# Patient Record
Sex: FEMALE | Race: WHITE | Employment: FULL TIME | URBAN - METROPOLITAN AREA
[De-identification: names, ages, dates, MRNs, and addresses within clinical notes are randomized per-mention and may not be internally consistent; named-entity substitution may affect disease eponyms.]

---

## 2018-11-20 ENCOUNTER — OFFICE VISIT (OUTPATIENT)
Dept: ORTHOPEDIC SURGERY | Age: 48
End: 2018-11-20
Payer: COMMERCIAL

## 2018-11-20 VITALS
HEART RATE: 83 BPM | WEIGHT: 176 LBS | BODY MASS INDEX: 30.05 KG/M2 | DIASTOLIC BLOOD PRESSURE: 95 MMHG | SYSTOLIC BLOOD PRESSURE: 136 MMHG | HEIGHT: 64 IN

## 2018-11-20 DIAGNOSIS — M25.521 RIGHT ELBOW PAIN: Primary | ICD-10-CM

## 2018-11-20 DIAGNOSIS — R20.0 RIGHT ARM NUMBNESS: ICD-10-CM

## 2018-11-20 DIAGNOSIS — M77.11 RIGHT LATERAL EPICONDYLITIS: ICD-10-CM

## 2018-11-20 PROCEDURE — L3908 WHO COCK-UP NONMOLDE PRE OTS: HCPCS | Performed by: FAMILY MEDICINE

## 2018-11-20 PROCEDURE — 99203 OFFICE O/P NEW LOW 30 MIN: CPT | Performed by: FAMILY MEDICINE

## 2018-11-20 PROCEDURE — 20550 NJX 1 TENDON SHEATH/LIGAMENT: CPT | Performed by: FAMILY MEDICINE

## 2018-11-20 PROCEDURE — MISCD86 TENNIS ELBOW STRAP-BREG: Performed by: FAMILY MEDICINE

## 2018-11-20 RX ORDER — DICLOFENAC SODIUM 75 MG/1
75 TABLET, DELAYED RELEASE ORAL 2 TIMES DAILY WITH MEALS
Qty: 60 TABLET | Refills: 3 | Status: CANCELLED | OUTPATIENT
Start: 2018-11-20

## 2018-11-20 RX ORDER — HYDROCODONE BITARTRATE AND ACETAMINOPHEN 5; 325 MG/1; MG/1
TABLET ORAL
Refills: 0 | COMMUNITY
Start: 2018-11-15 | End: 2020-07-23

## 2018-11-20 RX ORDER — DROSPIRENONE AND ETHINYL ESTRADIOL 0.02-3(28)
1 KIT ORAL
COMMUNITY

## 2018-11-20 RX ORDER — TRAMADOL HYDROCHLORIDE 50 MG/1
50 TABLET ORAL EVERY 6 HOURS PRN
Qty: 28 TABLET | Refills: 0 | Status: SHIPPED | OUTPATIENT
Start: 2018-11-20 | End: 2018-11-27

## 2018-11-20 RX ORDER — METHYLPREDNISOLONE 4 MG/1
TABLET ORAL
Qty: 21 KIT | Refills: 0 | Status: SHIPPED | OUTPATIENT
Start: 2018-11-20 | End: 2020-07-23 | Stop reason: ALTCHOICE

## 2018-11-20 RX ORDER — DICLOFENAC SODIUM 75 MG/1
75 TABLET, DELAYED RELEASE ORAL
COMMUNITY
Start: 2018-10-17 | End: 2020-07-23

## 2018-11-20 NOTE — PROGRESS NOTES
STRAP-BREG     Patient was supplied a Breg Tennis Elbow Strap. This retail item was supplied to provide functional support and assist in protecting the affected area. Verbal and written instructions for the use of and application of this item were provided. The patient was educated and fit by a healthcare professional with expert knowledge and specialization in brace application. They were instructed to contact the office immediately should the equipment result in increased pain, decreased sensation, increased swelling or worsening of the condition.  XR ELBOW RIGHT (MIN 3 VIEWS)    External Referral To Occupational Therapy     Referral Priority:   Routine     Referral Type:   Eval and Treat     Referral Reason:   Specialty Services Required     Referred to Provider: Humble Bedoya OT     Requested Specialty:   Occupational Therapy     Number of Visits Requested:   1    EMG     Scheduling Instructions:      DR Bessie Linares OR [facility]     Order Specific Question:   Which body part? Answer:   RUE, R/O ULNAR NEUROPATHY/ CTS    IN BETAMETHASONE ACET&SOD PHOSP    IN INJECT TENDON SHEATH/LIGAMENT    Titan Wrist Orthosis Brace     Patient was prescribed a Kalina Mccartney Titan Wrist Orthosis. The right wrist will require stabilization / immobilization from this semi-rigid / rigid orthosis to improve their function. The orthosis will assist in protecting the affected area, provide functional support and facilitate healing. The patient was educated and fit by a healthcare professional with expert knowledge and specialization in brace application while under the direct supervision of the treating physician. Verbal and written instructions for the use of and application of this item were provided. They were instructed to contact the office immediately should the brace result in increased pain, decreased sensation, increased swelling or worsening of the condition.        Treatment Plan:  Treatment options

## 2018-12-06 ENCOUNTER — OFFICE VISIT (OUTPATIENT)
Dept: ORTHOPEDIC SURGERY | Age: 48
End: 2018-12-06
Payer: COMMERCIAL

## 2018-12-06 DIAGNOSIS — M79.601 PAIN OF RIGHT UPPER EXTREMITY: Primary | ICD-10-CM

## 2018-12-06 PROCEDURE — 95908 NRV CNDJ TST 3-4 STUDIES: CPT | Performed by: PHYSICAL MEDICINE & REHABILITATION

## 2018-12-06 PROCEDURE — 95886 MUSC TEST DONE W/N TEST COMP: CPT | Performed by: PHYSICAL MEDICINE & REHABILITATION

## 2018-12-13 DIAGNOSIS — R20.0 RIGHT ARM NUMBNESS: ICD-10-CM

## 2018-12-13 DIAGNOSIS — M77.11 RIGHT LATERAL EPICONDYLITIS: Primary | ICD-10-CM

## 2018-12-13 DIAGNOSIS — M25.521 RIGHT ELBOW PAIN: ICD-10-CM

## 2020-06-02 ENCOUNTER — OFFICE VISIT (OUTPATIENT)
Dept: ORTHOPEDIC SURGERY | Age: 50
End: 2020-06-02
Payer: COMMERCIAL

## 2020-06-02 ENCOUNTER — HOSPITAL ENCOUNTER (OUTPATIENT)
Dept: PHYSICAL THERAPY | Age: 50
Setting detail: THERAPIES SERIES
Discharge: HOME | End: 2020-06-02
Payer: COMMERCIAL

## 2020-06-02 VITALS — BODY MASS INDEX: 29.02 KG/M2 | WEIGHT: 170 LBS | HEIGHT: 64 IN

## 2020-06-02 PROCEDURE — 97161 PT EVAL LOW COMPLEX 20 MIN: CPT | Performed by: PHYSICAL THERAPIST

## 2020-06-02 PROCEDURE — 99214 OFFICE O/P EST MOD 30 MIN: CPT | Performed by: FAMILY MEDICINE

## 2020-06-02 PROCEDURE — L0625 LO FLEX L1-BELOW L5 PRE OTS: HCPCS | Performed by: FAMILY MEDICINE

## 2020-06-02 PROCEDURE — 97110 THERAPEUTIC EXERCISES: CPT | Performed by: PHYSICAL THERAPIST

## 2020-06-02 RX ORDER — CYCLOBENZAPRINE HCL 5 MG
TABLET ORAL
COMMUNITY
Start: 2020-05-31 | End: 2020-07-23

## 2020-06-02 RX ORDER — DICLOFENAC SODIUM 75 MG/1
TABLET, DELAYED RELEASE ORAL
Qty: 180 TABLET | Refills: 0 | Status: SHIPPED | OUTPATIENT
Start: 2020-06-02 | End: 2020-07-23

## 2020-06-02 RX ORDER — DICLOFENAC SODIUM 75 MG/1
75 TABLET, DELAYED RELEASE ORAL 2 TIMES DAILY
Qty: 60 TABLET | Refills: 3 | Status: SHIPPED | OUTPATIENT
Start: 2020-06-02 | End: 2020-06-02

## 2020-06-02 RX ORDER — METHYLPREDNISOLONE 4 MG/1
TABLET ORAL
Qty: 21 KIT | Refills: 0 | Status: SHIPPED | OUTPATIENT
Start: 2020-06-02 | End: 2020-07-23 | Stop reason: ALTCHOICE

## 2020-06-02 NOTE — FLOWSHEET NOTE
B hip IR                     Supine Exam Normal Abnormal N/A Comments   Hip flexion       Abduction       ER       IR       ELÍAS/Derek       Hip scour       SLR       Crossed SLR       Supine to sit       SI distraction/compression       Hip thrust                     Prone Exam Normal Abnormal N/A Comments   Prone knee bend       Prone hip IR       B Achilles reflex/Pheasant       PA/Spring       Prone Instability test       Sacral Spring/thrust                       ROM LEFT RIGHT Comments   Lumbar Flex      Lumbar Ext      Side Bend      Rotation                    ROM LEFT RIGHT Comments   Hip Flexion      Hip Abd      Hip ER      Hip IR      Hip Extension      Knee Ext      Knee Flex      Hamstring Flex      Piriformis                    Strength LEFT RIGHT Comments   Multifidus      Transverse Ab      Hip Flexors      Hip Abductors      Hip Extensors                     Myotomes Normal Abnormal Comments   Hip flexion (L1-L2)      Knee extension (L2-L4)      Dorsiflexion (L4-L5)      Great Toe Ext (L5)      Ankle Eversion (S1-S2)      Ankle PF(S1-S2)          Dermatomes Normal Abnormal Comments   inguinal area (L1)       anterior mid-thigh (L2)      distal ant thigh/med knee (L3)      medial lower leg and foot (L4)      lateral lower leg and foot (L5)      posterior calf (S1)      medial calcaneus (S2)          Neural dynamic tension testing Normal Abnormal Comments   Slump Test  - Degree of knee flexion:       SLR       0-30      30-70      Femoral nerve (L2-4)        Reflexes Normal Abnormal Comments   S1-2 Seated achilles      S1-2 Prone knee bend      L3-4 Patellar tendon      C5-6 Biceps      C6 Brachioradialis      C7-8 Triceps      Clonus      Babinski      Flores's            Positional Tolerance     Standing repeated flex    Standing repeated ext    Prone laying    Prone prop    Prone laying with pillow    Supine manual B LE distraction    Supine with wedge under LE HEP and progression per patient tolerance, in order to prevent re-injury. [] Progressing: [] Met: [] Not Met: [] Adjusted   2. Patient will report pain at worst less than or equal to 8/10  [] Progressing: [] Met: [] Not Met: [] Adjusted    Long Term Goals: To be achieved in: 6 weeks  1. Disability index score of 15% or less for the SYMONE to assist with reaching prior level of function. [] Progressing: [] Met: [] Not Met: [] Adjusted  2. Patient will demonstrate increased AROM to full lumbar mobility to allow for proper joint functioning as indicated by patients functional deficits. [] Progressing: [] Met: [] Not Met: [] Adjusted  3. Patient will demonstrate an increase in Strength to left hip flexor and hamstring to 4+/5 without pain in back to allow for proper functional mobility as indicated by patients Functional Deficits. [] Progressing: [] Met: [] Not Met: [] Adjusted  4. Patient will return to performing all self care without increased symptoms or restriction. [] Progressing: [] Met: [] Not Met: [] Adjusted  5. Pt will report pain at worst less than or equal to 2/10. [] Progressing: [] Met: [] Not Met: [] Adjusted     Overall Progression Towards Functional goals/ Treatment Progress Update:  [] Patient is progressing as expected towards functional goals listed. [] Progression is slowed due to complexities/Impairments listed. [] Progression has been slowed due to co-morbidities.   [x] Plan just implemented, too soon to assess goals progression <30days   [] Goals require adjustment due to lack of progress  [] Patient is not progressing as expected and requires additional follow up with physician  [] Other    Prognosis for POC: [x] Good [] Fair  [] Poor      Patient requires continued skilled intervention: [x] Yes  [] No    Treatment/Activity Tolerance:  [x] Patient able to complete treatment  [] Patient limited by fatigue  [] Patient limited by pain     [] Patient limited by other medical complications  [] Other: Pt is a 50 y/o female presenting with diagnosis of left sided LBP from the MD.  Clinically, the pt presents with decreased ROM, decreased strength, decreased function, and increased pain consistent with the MD diagnosis.  The pt would benefit from skilled PT to return to P[facility].  Pt had significant pain starting on Sunday. She had to go to urgent care. However, she has had improvement over the last day. Her pain does appear to be muscular in nature possibly secondary to scoliosis. We did review her anatomy and normal anatomy. Pt was understanding. We were unable to schedule her for next week at this point, but we are planning on calling her to get her in next week when we hopefully have a more full staff.  Anais Mccullough did review insurance benefits as well.  They did not have questions regarding this.      PLAN: If pt doesn't return, this note can be considered a D/C note.   See eval.  Consider STM over quadratus lumborum on left, progressing core exercises, adding bike for warm up.  [] Continue per plan of care [] Alter current plan (see comments above)  [x] Plan of care initiated [] Hold pending MD visit [] Discharge  Electronically signed by: Pricila March DPT [id number]

## 2020-06-02 NOTE — PLAN OF CARE
[address], [address] [place], [address]  Phone: [phone]   Fax: [phone]              Physical Therapy Certification    Dear Referring Practitioner: Desire Beaver,    We had the pleasure of evaluating the following patient for physical therapy services at     [address] [place] [address]. A summary of our findings can be found in the initial assessment below. This includes our plan of care. If you have any questions or concerns regarding these findings, please do not hesitate to contact me at the office phone number checked above. Thank you for the referral.       Physician Signature:_______________________________Date:__________________  By signing above (or electronic signature), therapists plan is approved by physician      Patient: Nico Stanley   : 1970   MRN: [id number]  Referring Physician: Referring Practitioner: Desire Beaver      Evaluation Date: 2020      Medical Diagnosis Information:  Diagnosis: M41.9 (ICD-10-CM) - Scoliosis of lumbar spine, unspecified scoliosis type; S39.012A (ICD-10-CM) - Strain of lumbar region, initial encounter; M54.5 (ICD-10-CM) - Acute left-sided low back pain, unspecified whether sciatica present   Treatment Diagnosis: M54.5 (ICD-10-CM) - Acute left-sided low back pain, unspecified whether sciatica present          Precautions/ Contra-indications: occ headaches/migraines  C-SSRS Triggered by Intake questionnaire (Past 2 wk assessment):   [x] No, Questionnaire did not trigger screening.   [] Yes, Patient intake triggered further evaluation      [] C-SSRS Screening completed  [] PCP notified via Plan of Care  [] Emergency services notified   Latex Allergy:  [x] NO      [] YES  Preferred Language for Healthcare:   [x] English       [] other:    SUBJECTIVE: Patient stated complaint:  She was fine one minute, but the next minute it hurt. She had pain that started .   The pain was so consider:    [] no symptoms distal to the knee    [] no red flags and minimal yellow flags    [] no contraindications to manipulation   []  \"Traction subgroup\"      [] signs and symptoms of nerve root compression (radiculopathy)     [] unable to centralize distal symptoms with movement    [] pain or numbness in the lumbar spine, buttock, and/or LE    [] peripheralization with extension movements    [] + SLR or + crossed SLR (symptoms less than 70 degrees)  [] Movement Control Approach   [] \"Stabilization subgroup\"    [] younger age (less than 36)     [] greater general flexibility (post-partum, SLR >90)    [] abberant movements, painful arc, or Wheatland's sign with flex/ext ROM    [] positive prone instability test  [x] Functional Optimization Approach   [] \"unspecified subgroup\"    [] lower disability scores     [] lower fear avoidance scores     [] longer duration of symptoms (chronic)    [] prior episodes of low back pain    [] older age                                  [x] Patient history, allergies, meds reviewed. Medical chart reviewed. See intake form. Review Of Systems (ROS):  [x]Performed Review of systems (Integumentary, CardioPulmonary, Neurological) by intake and observation. Intake form has been scanned into medical record. Patient has been instructed to contact their primary care physician regarding ROS issues if not already being addressed at this time.       Co-morbidities/Complexities (which will affect course of rehabilitation):   [x]None           Arthritic conditions   []Rheumatoid arthritis (M05.9)  []Osteoarthritis (M19.91)   Cardiovascular conditions   []Hypertension (I10)  []Hyperlipidemia (E78.5)  []Angina pectoris (I20)  []Atherosclerosis (I70)   Musculoskeletal conditions   []Disc pathology   []Congenital spine pathologies   []Prior surgical intervention  []Osteoporosis (M81.8)  []Osteopenia (M85.8)   Endocrine conditions   []Hypothyroid (E03.9)  []Hyperthyroid Gastrointestinal conditions PT to return to [facility]. Pt had significant pain starting on Sunday. She had to go to urgent care. However, she has had improvement over the last day. Her pain does appear to be muscular in nature possibly secondary to scoliosis. We did review her anatomy and normal anatomy. Pt was understanding. We were unable to schedule her for next week at this point, but we are planning on calling her to get her in next week when we hopefully have a more full staff. We did review insurance benefits as well. They did not have questions regarding this. Prognosis/Rehab Potential:      []Excellent   [x]Good    []Fair   []Poor    Tolerance of evaluation/treatment:    []Excellent   [x]Good    []Fair   []Poor    PLAN: Begin PT focusing on: proximal hip mobilizations, LB mobs, LB core activation, proximal hip activation, and HEP    Frequency/Duration:  1-2 days per week for 4-6 Weeks:  Interventions:  [x]  Therapeutic exercise including: strength training, ROM, for LE, Glutes and core   [x]  NMR activation and proprioception for glutes , LE and Core   [x]  Manual therapy as indicated for Hip complex, LE and spine to include: Dry Needling/IASTM, STM, PROM, Gr I-IV mobilizations, manipulation. [x]  Modalities as needed that may include: thermal agents, E-stim, Biofeedback, US, iontophoresis as indicated  [x]  Patient education on joint protection, postural re-education, activity modification, progression of HEP. HEP instruction: (see scanned forms)      GOALS:   Patient stated goal: no pain. Normal movement    [] Progressing: [] Met: [] Not Met: [] Adjusted    Therapist goals for Patient:   Short Term Goals: To be achieved in: 2 weeks  1. Independent in HEP and progression per patient tolerance, in order to prevent re-injury. [] Progressing: [] Met: [] Not Met: [] Adjusted   2. Patient will report pain at worst less than or equal to 8/10  [] Progressing: [] Met: [] Not Met: [] Adjusted    Long Term Goals:  To be achieved in: 6 weeks  1. Disability index score of 15% or less for the SYMONE to assist with reaching prior level of function. [] Progressing: [] Met: [] Not Met: [] Adjusted  2. Patient will demonstrate increased AROM to full lumbar mobility to allow for proper joint functioning as indicated by patients functional deficits. [] Progressing: [] Met: [] Not Met: [] Adjusted  3. Patient will demonstrate an increase in Strength to left hip flexor and hamstring to 4+/5 without pain in back to allow for proper functional mobility as indicated by patients Functional Deficits. [] Progressing: [] Met: [] Not Met: [] Adjusted  4. Patient will return to performing all self care without increased symptoms or restriction. [] Progressing: [] Met: [] Not Met: [] Adjusted  5. Pt will report pain at worst less than or equal to 2/10. [] Progressing: [] Met: [] Not Met: [] Adjusted      Physical Therapy Evaluation Complexity Justification  [x] A history of present problem with:  [] no personal factors and/or comorbidities that impact the plan of care;  [x]1-2 personal factors and/or comorbidities that impact the plan of care  []3 personal factors and/or comorbidities that impact the plan of care  [x] An examination of body systems using standardized tests and measures addressing any of the following: body structures and functions (impairments), activity limitations, and/or participation restrictions;:  [] a total of 1-2 or more elements   [] a total of 3 or more elements   [x] a total of 4 or more elements   [x] A clinical presentation with:  [x] stable and/or uncomplicated characteristics   [] evolving clinical presentation with changing characteristics  [] unstable and unpredictable characteristics;   [x] Clinical decision making of [] low, [] moderate, [] high complexity using standardized patient assessment instrument and/or measurable assessment of functional outcome.     [x] EVAL (LOW) 91718 (typically 20 minutes

## 2020-06-02 NOTE — PROGRESS NOTES
Chief Complaint  Lower Back Pain (Lower Left Back)    History of Present Illness for New Patient:    Darian Pereira is a 52 y.o. female is a very pleasant white female right-hand dominant white female, with wife of Amando Khan who is a  who is a patient of Dr. Cynthia Hughes who is being seen today upon self-referral for evaluation of acute onset of pain to her left greater than right lumbar spine. She states that on 5/31/2020 after repetitively using her  she was finishing up when she felt a very sharp sudden onset of pain to the left side of her lower lumbar spine. Her past history is remarkable for scoliosis but she is never had radiculopathy. Her pain symptoms did progressively worsen to the point where she went to urgent care and was given a muscle relaxer and told to undergo relative rest.  She has been sleeping on the couch with mild improvement of her symptoms. She does have a baseline achy pain at 2 to 3-10 but if she twists and rotates it will be very sharp at 8-9 out of 10 pain. She has had some mild spasm and is not complaining of true radicular symptoms. She has been told in the past that she has 2 disc protrusions in the cervical spine and some herniations of the lumbar spine on MRI work-up several years ago in [facility] when she is being worked up for [address] which she does not have. She has been icing has not been really taking any type of anti-inflammatories and is being seen today for orthopedic and sports consultation with imaging. I have reviewed and attest the documentation of the HPI documented by my . I will make any changes if necessary. Enc Date: 6/2/2020  Time: 12:25 PM  Provider: Valley Hatchet, MD        Review of Systems  Pertinent items are noted in HPI  Review of systems reviewed from Patient History Form dated on 6/2/2020 and available in the patient's chart under the Media tab.        Vital Signs     Ht 5' 4\" (1.626 m)   Wt 170 lb (77.1 kg)   BMI 29.18 kg/m²     Past Medical History   has no past medical history on file. Past Surgical History   has no past surgical history on file. Social History     Tobacco Use    Smoking status: Never Smoker    Smokeless tobacco: Never Used   Substance Use Topics    Alcohol use: Not on file    Drug use: Not on file        Current Outpatient Medications   Medication Sig Dispense Refill    methylPREDNISolone (MEDROL DOSEPACK) 4 MG tablet Take by mouth as directed. 21 kit 0    diclofenac (VOLTAREN) 75 MG EC tablet Take 1 tablet by mouth 2 times daily 60 tablet 3    drospirenone-ethinyl estradiol (ANNI) 3-0.02 MG per tablet Take 1 tablet by mouth      cyclobenzaprine (FLEXERIL) 5 MG tablet TK 1 T PO Q 8 H FOR UP TO 10 DAYS PRF MUSCLE SPASMS. AVOID DRIVING WITHIN 8 H OF USING THIS MEDICNE      diclofenac (VOLTAREN) 75 MG EC tablet Take 75 mg by mouth      HYDROcodone-acetaminophen (NORCO) 5-325 MG per tablet TK 1 T PO Q 8 H PRN  0    methylPREDNISolone (MEDROL, ROSALIE,) 4 MG tablet Use as directed (Patient not taking: Reported on 6/2/2020) 21 kit 0     No current facility-administered medications for this visit. General Exam:   Constitutional: Patient is adequately groomed with no evidence of malnutrition  DTRs: Deep tendon reflexes are intact  Mental Status: The patient is oriented to time, place and person. The patient's mood and affect are appropriate. Lymphatic: The lymphatic examination bilaterally reveals all areas to be without enlargement or induration. Vascular: Examination reveals no swelling or calf tenderness. Peripheral pulses are palpable and 2+. Neurological: The patient has good coordination. There is no weakness or sensory deficit. Lumbar spine examination    Inspection: She does have an obvious scoliosis deformity but no focal soft tissue swelling, ecchymosis or palpable spasm.     Palpation: She does have clinical tenderness over the left greater than right

## 2020-06-11 ENCOUNTER — HOSPITAL ENCOUNTER (OUTPATIENT)
Dept: PHYSICAL THERAPY | Age: 50
Setting detail: THERAPIES SERIES
Discharge: HOME | End: 2020-06-11
Payer: COMMERCIAL

## 2020-06-11 PROCEDURE — 97112 NEUROMUSCULAR REEDUCATION: CPT

## 2020-06-11 PROCEDURE — 97110 THERAPEUTIC EXERCISES: CPT

## 2020-06-11 PROCEDURE — 97140 MANUAL THERAPY 1/> REGIONS: CPT

## 2020-06-11 NOTE — FLOWSHEET NOTE
(aberrant juttering or innominate mvmt)       Extension       Trendelenburg       Kemps/Quadrant       Stork       SLS/SLS w rotation                     Seated Exam Normal Abnormal N/A Comments   Pelvic Height       Seated Rotation       Seated flexion       B hip IR                     Supine Exam Normal Abnormal N/A Comments   Hip flexion       Abduction       ER       IR       ELÍAS/Derek       Hip scour       SLR       Crossed SLR       Supine to sit       SI distraction/compression       Hip thrust                     Prone Exam Normal Abnormal N/A Comments   Prone knee bend       Prone hip IR       B Achilles reflex/Pheasant       PA/Spring       Prone Instability test       Sacral Spring/thrust                       ROM LEFT RIGHT Comments   Lumbar Flex      Lumbar Ext      Side Bend      Rotation                    ROM LEFT RIGHT Comments   Hip Flexion      Hip Abd      Hip ER      Hip IR      Hip Extension      Knee Ext      Knee Flex      Hamstring Flex      Piriformis                    Strength LEFT RIGHT Comments   Multifidus      Transverse Ab      Hip Flexors      Hip Abductors      Hip Extensors                     Myotomes Normal Abnormal Comments   Hip flexion (L1-L2)      Knee extension (L2-L4)      Dorsiflexion (L4-L5)      Great Toe Ext (L5)      Ankle Eversion (S1-S2)      Ankle PF(S1-S2)          Dermatomes Normal Abnormal Comments   inguinal area (L1)       anterior mid-thigh (L2)      distal ant thigh/med knee (L3)      medial lower leg and foot (L4)      lateral lower leg and foot (L5)      posterior calf (S1)      medial calcaneus (S2)          Neural dynamic tension testing Normal Abnormal Comments   Slump Test  - Degree of knee flexion:       SLR       0-30      30-70      Femoral nerve (L2-4)        Reflexes Normal Abnormal Comments   S1-2 Seated achilles      S1-2 Prone knee bend      L3-4 Patellar tendon      C5-6 Biceps      C6 Brachioradialis      C7-8 Triceps      Clonus Babinski      Flores's            Positional Tolerance     Standing repeated flex    Standing repeated ext    Prone laying    Prone prop    Prone laying with pillow    Supine manual B LE distraction    Supine with wedge under LE              RESTRICTIONS/PRECAUTIONS: occ headaches/migraines, gal bladder , , laproscopy, scoliosis, 2 herniated disc in neck and back. Exercises/Interventions:         Therapeutic Ex (68937) Resistance/Repetitions Notes/CUES   LTR 10x:10    DKTC 8x:10 Some pain. Stopped early. Discussed SKTC for home or seated LB stretch   Seated quadratus lumborum stretch with arm OH 3x:30 Going to right   Supine hamstring stretch 3x:30 Left                        Therapeutic Activity (64602)                                        NMR re-education (24690)  CUES NEEDED   hooklying ab brace with pelvic floor contraction 10x:10    bridge 2x10 Cued for glute set  At TA prior to lift    Hip hikes  2x10 each side     SLS [id number] Cues for level hips and not to lean to left for compensation             Manual Intervention (06114)         Access Code: [email or web address]   URL: [employer]/   Date: 2020   Prepared by: Alda Chew     Exercises  Supine Lower Trunk Rotation - 10 reps - 1 sets - 10 hold - 1-2x daily - 7x weekly  Supine Double Knee to Chest - 10 reps - 10 hold - 1-2x daily - 7x weekly  Seated Quadratus Lumborum Stretch with Arm Overhead - 3-5 reps - 30 hold - 1-2x daily - 7x weekly  Supine Hamstring Stretch with Strap - 3-5 reps - 30 hold - 1-2x daily - 7x weekly  Supine Transversus Abdominis Bracing with Pelvic Floor Contraction - 10 reps - 10 hold - 1-2x daily - 7x weekly    Therapeutic Exercise and NMR EXR  [x] (77950) Provided verbal/tactile cueing for activities related to strengthening, flexibility, endurance, ROM for improvements in LE, proximal hip, and core control with self care, mobility, lifting, ambulation.   [x] (30828) Provided verbal/tactile

## 2020-06-18 ENCOUNTER — HOSPITAL ENCOUNTER (OUTPATIENT)
Dept: PHYSICAL THERAPY | Age: 50
Setting detail: THERAPIES SERIES
Discharge: HOME | End: 2020-06-18
Payer: COMMERCIAL

## 2020-06-18 PROCEDURE — 97140 MANUAL THERAPY 1/> REGIONS: CPT | Performed by: PHYSICAL THERAPIST

## 2020-06-18 PROCEDURE — 97112 NEUROMUSCULAR REEDUCATION: CPT | Performed by: PHYSICAL THERAPIST

## 2020-06-18 PROCEDURE — 97110 THERAPEUTIC EXERCISES: CPT | Performed by: PHYSICAL THERAPIST

## 2020-06-18 NOTE — FLOWSHEET NOTE
Orthopaedics and [address] [place] [address] DR PALMIRA KERN    Physical Therapy Treatment Note/ Progress Report:           Date:  2020    Patient Name:  Nico Stanley    :  1970  MRN: [id number]  Restrictions/Precautions:    Medical/Treatment Diagnosis Information:  · Diagnosis: M41.9 (ICD-10-CM) - Scoliosis of lumbar spine, unspecified scoliosis type; S39.012A (ICD-10-CM) - Strain of lumbar region, initial encounter; M54.5 (ICD-10-CM) - Acute left-sided low back pain, unspecified whether sciatica present. Onset-31 May 2020  · Treatment Diagnosis: M54.5 (ICD-10-CM) - Acute left-sided low back pain, unspecified whether sciatica present  Insurance/Certification information:  PT Insurance Information: Joe  Physician Information:  Referring Practitioner: Desire Beaver  Has the plan of care been signed (Y/N):        []  Yes  [x]  No     Date of Patient follow up with Physician: around 2020 prn      Is this a Progress Report:     []  Yes  [x]  No        If Yes:  Date Range for reporting period:  Beginning 2020  Ending    Progress report will be due (10 Rx or 30 days whichever is less): visit 10 or 1565       Recertification will be due (POC Duration  / 90 days whichever is less): see above         Visit # Insurance Allowable Auth Required   3 30, but needs auth after 25 [x]  Yes []  No        Functional Scale: SYMONE-35%   Date assessed: 2020    Latex Allergy:  [x] NO      [] YES  Preferred Language for Healthcare:   [] English       [] other:      Pain level:  1/10      SUBJECTIVE:  Her lower back is hurting as she feels she slept on it funny last night. This is not the same pain that she came in for.        OBJECTIVE: See eval      Standing Exam Normal Abnormal N/A Comments   Toe walk         Heel Walk       Side bending       Pelvic Height       Fwd Bend- (aberrant juttering or innominate mvmt)       Extension       Trendelenburg       Kemps/Quadrant       Stork       SLS/SLS w rotation Prone laying with pillow    Supine manual B LE distraction    Supine with wedge under LE              RESTRICTIONS/PRECAUTIONS: occ headaches/migraines, gal bladder , , laproscopy, scoliosis, 2 herniated disc in neck and back. Exercises/Interventions:         Therapeutic Ex (02124) Resistance/Repetitions Notes/CUES   LTR 10x:10    DKTC 10x:10 Some pain. Stopped early. Discussed SKTC for home or seated LB stretch   Seated quadratus lumborum stretch with arm OH 3x:30 Going to right   Supine hamstring stretch 3x:30 Left. Did cause pain. Therapeutic Activity (83694)     Bike 6' L1                                  NMR re-education (79726)  CUES NEEDED   hooklying ab brace with pelvic floor contraction 10x:10 With biofeedback and tactile cuing   bridge 2x10 Cued for glute set  At TA prior to lift    Hip hikes  2x10 each side     SLS [id number] bilaterally Cues for level hips and not to lean to left for compensation   sidelying hip circles 10x each To engage core        Manual Intervention (87009) STM over paraspinals and quadratus lumborum in sidelying manipulation position with rotation stretch 8'       Access Code: [email or web address]   URL: [email or web address]   Date: 2020   Prepared by: Omaira Chew     Exercises  Supine Lower Trunk Rotation - 10 reps - 1 sets - 10 hold - 1-2x daily - 7x weekly  Supine Double Knee to Chest - 10 reps - 10 hold - 1-2x daily - 7x weekly  Seated Quadratus Lumborum Stretch with Arm Overhead - 3-5 reps - 30 hold - 1-2x daily - 7x weekly  Supine Hamstring Stretch with Strap - 3-5 reps - 30 hold - 1-2x daily - 7x weekly  Supine Transversus Abdominis Bracing with Pelvic Floor Contraction - 10 reps - 10 hold - 1-2x daily - 7x weekly  Standing Hip Hiking - 10 reps - 2 sets - 1-2x daily - 7x weekly  Sidelying Hip Circles - 10 reps - 1-3 sets - 1-2x daily - 7x weekly  Single Leg Balance - 3 reps - 20 hold - 1-2x daily - 7x weekly    Therapeutic Exercise and NMR EXR  [x] (25481) Provided verbal/tactile cueing for activities related to strengthening, flexibility, endurance, ROM for improvements in LE, proximal hip, and core control with self care, mobility, lifting, ambulation. [x] (63595) Provided verbal/tactile cueing for activities related to improving balance, coordination, kinesthetic sense, posture, motor skill, proprioception  to assist with LE, proximal hip, and core control in self care, mobility, lifting, ambulation and eccentric single leg control.      NMR and Therapeutic Activities:    [x] (30193 or 36097) Provided verbal/tactile cueing for activities related to improving balance, coordination, kinesthetic sense, posture, motor skill, proprioception and motor activation to allow for proper function of core, proximal hip and LE with self care and ADLs  [x] (09469) Gait Re-education- Provided training and instruction to the patient for proper LE, core and proximal hip recruitment and positioning and eccentric body weight control with ambulation re-education including up and down stairs     Home Exercise Program:    [x] (34556) Reviewed/Progressed HEP activities related to strengthening, flexibility, endurance, ROM of core, proximal hip and LE for functional self-care, mobility, lifting and ambulation/stair navigation   [] (66845)Reviewed/Progressed HEP activities related to improving balance, coordination, kinesthetic sense, posture, motor skill, proprioception of core, proximal hip and LE for self care, mobility, lifting, and ambulation/stair navigation      Manual Treatments:  PROM / STM / Oscillations-Mobs:  G-I, II, III, IV (PA's, Inf., Post.)  [x] (99994) Provided manual therapy to mobilize LE, proximal hip and/or LS spine soft tissue/joints for the purpose of modulating pain, promoting relaxation,  increasing ROM, reducing/eliminating soft tissue swelling/inflammation/restriction, improving soft tissue extensibility and allowing for proper functional goals listed. [] Progression is slowed due to complexities/Impairments listed. [] Progression has been slowed due to co-morbidities. [x] Plan just implemented, too soon to assess goals progression <30days   [] Goals require adjustment due to lack of progress  [] Patient is not progressing as expected and requires additional follow up with physician  [] Other    Prognosis for POC: [x] Good [] Fair  [] Poor      Patient requires continued skilled intervention: [x] Yes  [] No    Treatment/Activity Tolerance:  [x] Patient able to complete treatment  [] Patient limited by fatigue  [] Patient limited by pain     [] Patient limited by other medical complications  [] Other:  Pt tito tx well. She has significantly less pain than when she started. She demo fairly good comprehension of HEP. I did have to give her cues for ab brace, and she did improve with this by the time she got though the rest of them. She will f/u again next week with the plan of returning to see the MD soon afterwards. She did demo some hip ABD weakness as she felt the hip ABD circles in her glute med. Pt would continue to benefit from skilled PT to improve strength, ROM and function    PLAN: If pt doesn't return, this note can be considered a D/C note. See eval.  Progress core work.   [] Continue per plan of care [] Alter current plan (see comments above)  [x] Plan of care initiated [] Hold pending MD visit [] Discharge  Electronically signed by: Deena Mcleod DPT [id number]

## 2020-06-25 ENCOUNTER — HOSPITAL ENCOUNTER (OUTPATIENT)
Dept: PHYSICAL THERAPY | Age: 50
Setting detail: THERAPIES SERIES
Discharge: HOME | End: 2020-06-25
Payer: COMMERCIAL

## 2020-06-25 PROCEDURE — 97112 NEUROMUSCULAR REEDUCATION: CPT | Performed by: PHYSICAL THERAPIST

## 2020-06-25 PROCEDURE — 97110 THERAPEUTIC EXERCISES: CPT | Performed by: PHYSICAL THERAPIST

## 2020-06-25 PROCEDURE — 97140 MANUAL THERAPY 1/> REGIONS: CPT | Performed by: PHYSICAL THERAPIST

## 2020-06-25 NOTE — FLOWSHEET NOTE
Orthopaedics and [address] [place] [address] DR PALMIRA KERN    Physical Therapy Treatment Note/ Progress Report:           Date:  2020    Patient Name:  Diya Merrill    :  1970  MRN: [id number]  Restrictions/Precautions:    Medical/Treatment Diagnosis Information:  · Diagnosis: M41.9 (ICD-10-CM) - Scoliosis of lumbar spine, unspecified scoliosis type; S39.012A (ICD-10-CM) - Strain of lumbar region, initial encounter; M54.5 (ICD-10-CM) - Acute left-sided low back pain, unspecified whether sciatica present. Onset-31 May 2020  · Treatment Diagnosis: M54.5 (ICD-10-CM) - Acute left-sided low back pain, unspecified whether sciatica present  Insurance/Certification information:  PT Insurance Information: Joe  Physician Information:  Referring Practitioner: Usman Loera  Has the plan of care been signed (Y/N):        []  Yes  [x]  No     Date of Patient follow up with Physician: around 2020 prn      Is this a Progress Report:     []  Yes  [x]  No        If Yes:  Date Range for reporting period:  Beginning 2020  Ending    Progress report will be due (10 Rx or 30 days whichever is less): visit 10 or 9313       Recertification will be due (POC Duration  / 90 days whichever is less): see above         Visit # Insurance Allowable Auth Required   4 30, but needs auth after 25 [x]  Yes []  No        Functional Scale: SYMONE-35%   Date assessed: 2020    Latex Allergy:  [x] NO      [] YES  Preferred Language for Healthcare:   [] English       [] other:      Pain level:  0/10      SUBJECTIVE:  She is not having her pain that brought her here. She has her normal back pain. She thinks it is from sleeping. She's had this problem before.       OBJECTIVE: See eval      Standing Exam Normal Abnormal N/A Comments   Toe walk         Heel Walk       Side bending       Pelvic Height       Fwd Bend- (aberrant juttering or innominate mvmt)       Extension       Trendelenburg       Kemps/Quadrant       Stork relaxation,  increasing ROM, reducing/eliminating soft tissue swelling/inflammation/restriction, improving soft tissue extensibility and allowing for proper ROM for normal function with self care, mobility, lifting and ambulation. Modalities: MHP? Charges:   Timed Code Treatment Minutes: 61'   Total Treatment Minutes: 79'     [] EVAL (LOW) 455 1011   [] EVAL (MOD) 25753   [] EVAL (HIGH) 34859   [] RE-EVAL   [x] GY(32232) x 1    [] IONTO  [x] NMR (57649) x 2     [] VASO  [x] Manual (42434) x 1     [] Other:  [] TA x      [] Mech Traction (09101)  [] ES(attended) (32186)      [] ES (un) (34464):       GOALS:   Patient stated goal: no pain. Normal movement    [] Progressing: [] Met: [] Not Met: [] Adjusted    Therapist goals for Patient:   Short Term Goals: To be achieved in: 2 weeks  1. Independent in HEP and progression per patient tolerance, in order to prevent re-injury. [] Progressing: [] Met: [] Not Met: [] Adjusted   2. Patient will report pain at worst less than or equal to 8/10  [] Progressing: [] Met: [] Not Met: [] Adjusted    Long Term Goals: To be achieved in: 6 weeks  1. Disability index score of 15% or less for the SYMONE to assist with reaching prior level of function. [] Progressing: [] Met: [] Not Met: [] Adjusted  2. Patient will demonstrate increased AROM to full lumbar mobility to allow for proper joint functioning as indicated by patients functional deficits. [] Progressing: [] Met: [] Not Met: [] Adjusted  3. Patient will demonstrate an increase in Strength to left hip flexor and hamstring to 4+/5 without pain in back to allow for proper functional mobility as indicated by patients Functional Deficits. [] Progressing: [] Met: [] Not Met: [] Adjusted  4. Patient will return to performing all self care without increased symptoms or restriction. [] Progressing: [] Met: [] Not Met: [] Adjusted  5. Pt will report pain at worst less than or equal to 2/10.     [] Progressing: []

## 2020-07-02 ENCOUNTER — OFFICE VISIT (OUTPATIENT)
Dept: ORTHOPEDIC SURGERY | Age: 50
End: 2020-07-02
Payer: COMMERCIAL

## 2020-07-02 VITALS — HEIGHT: 64 IN | WEIGHT: 169.97 LBS | BODY MASS INDEX: 29.02 KG/M2

## 2020-07-02 PROCEDURE — 99214 OFFICE O/P EST MOD 30 MIN: CPT | Performed by: FAMILY MEDICINE

## 2020-07-02 RX ORDER — CELECOXIB 200 MG/1
200 CAPSULE ORAL DAILY
Qty: 60 CAPSULE | Refills: 3 | Status: SHIPPED | OUTPATIENT
Start: 2020-07-02 | End: 2020-07-07

## 2020-07-02 NOTE — PROGRESS NOTES
Chief Complaint  Follow-up (Lower Back Pain (Lower Left Back))    History of Present Illness for New Patient:    Patricia Sol is a 48 y.o. female is a very pleasant white female right-hand dominant white female, with wife of Beck Arriola who is a  who is a patient of Dr. Jacinto Brooks who is being seen today upon self-referral for evaluation of acute onset of pain to her left greater than right lumbar spine. She states that on 5/31/2020 after repetitively using her  she was finishing up when she felt a very sharp sudden onset of pain to the left side of her lower lumbar spine. Her past history is remarkable for scoliosis but she is never had radiculopathy. Her pain symptoms did progressively worsen to the point where she went to urgent care and was given a muscle relaxer and told to undergo relative rest.  She has been sleeping on the couch with mild improvement of her symptoms. She does have a baseline achy pain at 2 to 3-10 but if she twists and rotates it will be very sharp at 8-9 out of 10 pain. She has had some mild spasm and is not complaining of true radicular symptoms. She has been told in the past that she has 2 disc protrusions in the cervical spine and some herniations of the lumbar spine on MRI work-up several years ago in [facility] when she is being worked up for [address] 87 which she does not have. She has been icing has not been really taking any type of anti-inflammatories and is being seen today for orthopedic and sports consultation with imaging. She was last seen in the office on 6/2/2020 diagnosed with axial mechanical low back pain without high-grade radiculopathy. She presents back today stating her symptoms have improved about 70% but is still having considerable difficulty lying on her left side while sleeping but this is also complicated by her history of restless leg syndrome which is been bothering her for a number of years.   She got substantial benefit from her Medrol pack and once again still denies radicular symptoms or neurogenic bowel or bladder symptoms. She does not believe the diclofenac is giving her much relief but for the most part she is functional once she starts moving in the morning. He does seem to be plateauing in her improvement and has had supervised therapy and is been compliant with her home-based exercises. I have reviewed and attest the documentation of the HPI documented by my . I will make any changes if necessary. Enc Date: 7/2/2020  Time: 10:09 AM  Provider: Cal Castillo MD        Review of Systems  Pertinent items are noted in HPI  Review of systems reviewed from Patient History Form dated on 6/2/2020 and available in the patient's chart under the Media tab. Vital Signs     Ht 5' 4.02\" (1.626 m)   Wt 169 lb 15.6 oz (77.1 kg)   BMI 29.16 kg/m²     Past Medical History   has no past medical history on file. Past Surgical History   has no past surgical history on file. Social History     Tobacco Use    Smoking status: Never Smoker    Smokeless tobacco: Never Used   Substance Use Topics    Alcohol use: Not on file    Drug use: Not on file        Current Outpatient Medications   Medication Sig Dispense Refill    celecoxib (CELEBREX) 200 MG capsule Take 1 capsule by mouth daily 60 capsule 3    cyclobenzaprine (FLEXERIL) 5 MG tablet TK 1 T PO Q 8 H FOR UP TO 10 DAYS PRF MUSCLE SPASMS. AVOID DRIVING WITHIN 8 H OF USING THIS MEDICNE      methylPREDNISolone (MEDROL DOSEPACK) 4 MG tablet Take by mouth as directed.  21 kit 0    diclofenac (VOLTAREN) 75 MG EC tablet TAKE 1 TABLET BY MOUTH TWICE DAILY 180 tablet 0    drospirenone-ethinyl estradiol (ANNI) 3-0.02 MG per tablet Take 1 tablet by mouth      diclofenac (VOLTAREN) 75 MG EC tablet Take 75 mg by mouth      HYDROcodone-acetaminophen (NORCO) 5-325 MG per tablet TK 1 T PO Q 8 H PRN  0    methylPREDNISolone (MEDROL, ROSALIE,) 4 MG tablet Use as directed 21 kit 0     No current facility-administered medications for this visit. General Exam:   Constitutional: Patient is adequately groomed with no evidence of malnutrition  DTRs: Deep tendon reflexes are intact  Mental Status: The patient is oriented to time, place and person. The patient's mood and affect are appropriate. Lymphatic: The lymphatic examination bilaterally reveals all areas to be without enlargement or induration. Vascular: Examination reveals no swelling or calf tenderness. Peripheral pulses are palpable and 2+. Neurological: The patient has good coordination. There is no weakness or sensory deficit. Lumbar spine examination    Inspection: She does have an obvious scoliosis deformity but no focal soft tissue swelling, ecchymosis or palpable spasm. Palpation: She does have less prominent clinical tenderness over the left greater than right lumbar paraspinals and is less tight. She does have some mild lower facet discomfort. She does have some posterior lateral and lateral gluteal tenderness left greater than right. Rang of Motion: She can forward flex to about 60-70. [place] She does have painful extension 45 out of 10 both of the right and left. Lateral bending rotation diminished by 20-25 %. Strength: There does not appear to be focal lower extremity motor deficits. Special Tests: She has an apparent negative left-sided straight leg raise is negative straight leg raise on the right. Screening hip testing reasonably benign    Skin: There are no rashes, ulcerations or lesions. Distal motor sensory and vascular exam is intact. Gait: Fluid smooth gait       Reflex symmetrically preserved      Additional Comments:   Evaluation of her hip on the left does reveal reasonable motion with exception of some mild gluteal tenderness to palpation. Her logroll testing appears to be negative with negative hip impingement testing and reasonable hip strength.   No evidence of instability. Additional Examinations:     Contralateral Exam: Examination of the right hip reveals intact skin. The patient demonstrates full painless range of motion with regards to flexion, abduction, internal and external rotation. There is not tenderness about the greater trochanter. There is a negative straight leg raise against resistance. Strength is 5/5 thorough out all planes. Right Lower Extremity: Examination of the right lower extremity does not show any tenderness, deformity or injury. Range of motion is unremarkable. There is no gross instability. There are no rashes, ulcerations or lesions. Strength and tone are normal.  Left Lower Extremity: Examination of the left lower extremity does not show any tenderness, deformity or injury. Range of motion is unremarkable. There is no gross instability. There are no rashes, ulcerations or lesions. Strength and tone are normal.        Diagnostic Test Findings:     Assessment & Plan:    Encounter Diagnoses   Name Primary?  Acute left-sided low back pain, unspecified whether sciatica present Yes    Strain of lumbar region, initial encounter        Orders Placed This Encounter   Procedures    MRI Lumbar Spine WO Contrast     Standing Status:   Future     Standing Expiration Date:   7/2/2021     Scheduling Instructions:      [phone]:       [address], [address], [facility] [address]            Date&Time:WILL CALL PATIENT ONCE APPROVED       Auth#     Order Specific Question:   Reason for exam:     Answer:   mri lumbar spine eval for ddd, herniated disc, facet dysfunction         Treatment Plan:  Treatment options were discussed with Patt Lott. We did once again review her plain films and exam findings.   She is only been symptomatic since early June 2020 and I do believe we are dealing with likely overuse lumbar straining although she does have a history of lumbar disc protrusions documented on MRI several years ago in [facility] which we do not have access to. She is not complaining of high-grade radicular symptoms and has improved but 60 to 70% with treatment over the past month does seem to be plateauing. She will continue with her warm-and-form belt and we will change her on her anti-inflammatories to Celebrex. She does not seem to be having much spasm although she does have some Flexeril if she has this. She still is having substantial nighttime disturbance and is unable to sleep on her left side due to back pain but this also seems to exacerbate her restless leg syndrome which is been bothering her for years. They have been thinking about getting a sleep number bed as I do think a firm mattress would be better for her. I would like for her to have an MRI to evaluate her previously diagnosed disc herniation but clinically on exam when I see if she has substantial facet arthropathy and capsulitis as there certainly may be a component of facet mediated pain. Potential for medial branch blocks and radiofrequency ablation depending on the results of her MRI scan was also discussed briefly. She will continue with her exercise program and supervised therapy. We will see her back post imaging. She will contact us in the interim with questions or concerns. This dictation was performed with a verbal recognition program (DRAGON) and it was checked for errors. It is possible that there are still dictated errors within this office note. If so, please bring any errors to my attention for an addendum. All efforts were made to ensure that this office note is accurate.

## 2020-07-07 ENCOUNTER — TELEPHONE (OUTPATIENT)
Dept: ORTHOPEDIC SURGERY | Age: 50
End: 2020-07-07

## 2020-07-07 RX ORDER — CELECOXIB 200 MG/1
200 CAPSULE ORAL DAILY
Qty: 90 CAPSULE | Refills: 0 | Status: SHIPPED | OUTPATIENT
Start: 2020-07-07 | End: 2020-07-23

## 2020-07-23 ENCOUNTER — OFFICE VISIT (OUTPATIENT)
Dept: ORTHOPEDIC SURGERY | Age: 50
End: 2020-07-23
Payer: COMMERCIAL

## 2020-07-23 VITALS — WEIGHT: 169 LBS | HEIGHT: 64 IN | BODY MASS INDEX: 28.85 KG/M2

## 2020-07-23 PROCEDURE — 99213 OFFICE O/P EST LOW 20 MIN: CPT | Performed by: FAMILY MEDICINE

## 2020-07-23 RX ORDER — GABAPENTIN 300 MG/1
CAPSULE ORAL
Qty: 60 CAPSULE | Refills: 1 | Status: SHIPPED | OUTPATIENT
Start: 2020-07-23 | End: 2020-07-24

## 2020-07-23 NOTE — PROGRESS NOTES
Chief Complaint  Back Pain (TR MRI LUMBAR SPINE)    History of Present Illness for New Patient:    Karen Swanson is a 48 y.o. female is a very pleasant white female right-hand dominant white female, with wife of Cherylene Starr who is a  who is a patient of Dr. Marylin Preston who is being seen today upon self-referral for evaluation of acute onset of pain to her left greater than right lumbar spine. She states that on 5/31/2020 after repetitively using her  she was finishing up when she felt a very sharp sudden onset of pain to the left side of her lower lumbar spine. Her past history is remarkable for scoliosis but she is never had radiculopathy. Her pain symptoms did progressively worsen to the point where she went to urgent care and was given a muscle relaxer and told to undergo relative rest.  She has been sleeping on the couch with mild improvement of her symptoms. She does have a baseline achy pain at 2 to 3-10 but if she twists and rotates it will be very sharp at 8-9 out of 10 pain. She has had some mild spasm and is not complaining of true radicular symptoms. She has been told in the past that she has 2 disc protrusions in the cervical spine and some herniations of the lumbar spine on MRI work-up several years ago in [facility] when she is being worked up for Lu[address] Rikki 87 which she does not have. She has been icing has not been really taking any type of anti-inflammatories and is being seen today for orthopedic and sports consultation with imaging. She was last seen in the office on 6/2/2020 diagnosed with axial mechanical low back pain without high-grade radiculopathy. She presents back today stating her symptoms have improved about 70% but is still having considerable difficulty lying on her left side while sleeping but this is also complicated by her history of restless leg syndrome which is been bothering her for a number of years.   She got substantial benefit from her [employer]  Pertinent items are noted in HPI  Review of systems reviewed from Patient History Form dated on 6/2/2020 and available in the patient's chart under the Media tab. Vital Signs     Ht 5' 4\" (1.626 m)   Wt 169 lb (76.7 kg)   BMI 29.01 kg/m²     Past Medical History   has no past medical history on file. Past Surgical History   has no past surgical history on file. Social History     Tobacco Use    Smoking status: Never Smoker    Smokeless tobacco: Never Used   Substance Use Topics    Alcohol use: Not on file    Drug use: Not on file        Current Outpatient Medications   Medication Sig Dispense Refill    gabapentin (NEURONTIN) 300 MG capsule TAKE 1 NIGHTLY BY MOUTH FOR 3 DAYS. THEN START WITH 2 PILLS BY MOUTH NIGHTLY FOR THE DURATION OF THE PRESCRIPTION. 60 capsule 1    drospirenone-ethinyl estradiol (ANNI) 3-0.02 MG per tablet Take 1 tablet by mouth       No current facility-administered medications for this visit. General Exam:   Constitutional: Patient is adequately groomed with no evidence of malnutrition  DTRs: Deep tendon reflexes are intact  Mental Status: The patient is oriented to time, place and person. The patient's mood and affect are appropriate. Lymphatic: The lymphatic examination bilaterally reveals all areas to be without enlargement or induration. Vascular: Examination reveals no swelling or calf tenderness. Peripheral pulses are palpable and 2+. Neurological: The patient has good coordination. There is no weakness or sensory deficit. Lumbar spine examination    Inspection: She does have an obvious scoliosis deformity but no focal soft tissue swelling, ecchymosis or palpable spasm. Mild scoliosis noted. Palpation: She does have less prominent clinical tenderness over the left greater than right lumbar paraspinals and is less tight. She does have some mild lower facet discomfort.   She does have some posterior lateral and lateral gluteal tenderness left greater than right. Rang of Motion: She can forward flex to about 60-70. Keira Simpler She does have painful extension 3 out of 10 both of the right and left. Lateral bending rotation diminished by 20-25 %. Strength: There does not appear to be focal lower extremity motor deficits. Special Tests: She has an apparent negative left-sided straight leg raise is negative straight leg raise on the right. Screening hip testing reasonably benign    Skin: There are no rashes, ulcerations or lesions. Distal motor sensory and vascular exam is intact. Gait: Fluid smooth gait       Reflex symmetrically preserved      Additional Comments:   Evaluation of her hip on the left does reveal reasonable motion with exception of some mild gluteal tenderness to palpation. Her logroll testing appears to be negative with negative hip impingement testing and reasonable hip strength. No evidence of instability. Additional Examinations:     Contralateral Exam: Examination of the right hip reveals intact skin. The patient demonstrates full painless range of motion with regards to flexion, abduction, internal and external rotation. There is not tenderness about the greater trochanter. There is a negative straight leg raise against resistance. Strength is 5/5 thorough out all planes. Right Lower Extremity: Examination of the right lower extremity does not show any tenderness, deformity or injury. Range of motion is unremarkable. There is no gross instability. There are no rashes, ulcerations or lesions. Strength and tone are normal.  Left Lower Extremity: Examination of the left lower extremity does not show any tenderness, deformity or injury. Range of motion is unremarkable. There is no gross instability. There are no rashes, ulcerations or lesions.   Strength and tone are normal.        Diagnostic Test Findings: Lumbar MRI performed at [facility] 7/20/2020 as listed above  Narrative    Site: [facility] Rad #: [id number] #: [id number] Procedure: MR Lumbar Spine w/o Contrast ; Reason for Exam: evaluate for degenerative disc disease, herniated disc, facet dysfunction    This document is confidential medical information.  Unauthorized disclosure or use of this information is prohibited by law. If you are not the intended recipient of this document, please advise us by calling immediately [phone].         [address]              Patient Name: Jd Salinas    Case ID: [id number]    Patient : 1970    Referring Physician: Julio Cesar Mac MD    Exam Date: 2020    Exam Description: MR Lumbar Spine w/o Contrast              HISTORY:  Evaluate for degenerative disease, herniated disc or facet dysfunction.  No recent    injury.  Patient has had intermittent low back pain over multiple years.         TECHNICAL FACTORS:  Long- and short-axis fat- and water-weighted images were performed.         COMPARISON:  None.         FINDINGS: Rolane Yolanda is an S-shaped curvature of the thoracolumbar spine with the main lower lumbar     convexity to the left side.  The conus medullaris is normal in appearance and terminates    behind the L1-2 level.         T10-11, T11-12, T12-L1, L2-3: No disc protrusion or herniation is identified.  No compressive    discopathy is seen.         L1-2: A shallow disc displacement with a left postforaminal protrusion and facet hypertrophy    contributing to mild left-sided exiting neural foraminal stenosis without nerve root    compression.         L3-4: A broad-based disc displacement and moderate to severe facet hypertrophy without    compressive discopathy.         L4-5: Desiccation of the disc, retrolisthesis, a broad-based disc displacement and a    superimposed central protrusion with leftward orientation and moderate facet hypertrophy    contributing to mild canal and left lateral recess stenosis with abutment of the descending    left L5 nerve.         L5-S1: Desiccation of the disc, disc height loss, retrolisthesis, a broad-based disc    displacement with leftward predominance, moderate right and moderate to severe left-sided facet     hypertrophy contribute to moderate left-sided exiting neural foraminal stenosis with abutment    of the exiting left L5 nerve with possible compression.         CONCLUSION:    1. A postforaminal protrusion on the left at the L1-2 level without nerve root compression. 2. Retrolisthesis, a broad-based disc displacement, a superimposed central protrusion with    leftward orientation and moderate facet hypertrophy at the L4-5 level contribute to abutment of     the descending left L5 nerve. 3. A broad-based disc displacement with leftward predominance, moderate right and moderate to    severe left-sided facet hypertrophy at the L5-S1 level contribute to moderate left-sided    exiting neural foraminal stenosis with abutment of the exiting left L5 nerve with possible    compression.         Thank you for the opportunity to provide your interpretation.                   Anne Dent MD         A: AF/ct 07/20/2020 5:35 PM    T: CT 07/20/2020 5:24 PM             Assessment & Plan:    Encounter Diagnoses   Name Primary?  Protrusion of lumbar intervertebral disc Yes    Acute left-sided low back pain, unspecified whether sciatica present     Lumbar facet joint syndrome     Lumbar spondylolysis        Orders Placed This Encounter   Procedures    Ambulatory referral to Physical Therapy     Referral Priority:   Routine     Referral Type:   Eval and Treat     Referral Reason:   Specialty Services Required     Requested Specialty:   Physical Therapy     Number of Visits Requested:   1         Treatment Plan:  Treatment options were discussed with Mauro Santamaria. We did once again review her plain films, her recent lumbar MRI and exam findings.   She is only been symptomatic since early June 2020 and I do believe we are dealing with likely overuse lumbar straining with residual myofascial pain although she does have MRI documented this protrusions at L4-5/L5-S1 resulting in some foraminal narrowing and possible abutment and compression of the left L5 nerve root. She also did have prominent moderate right and a moderate to severe left facet arthropathy. There may very well be a component of both facet mediated pain along with discogenic pain although she is not complaining of high-grade radicular symptoms. I would like for her to start back on her Celebrex 200 mg daily and she does have Flexeril to take if she has spasm. Once again her major concern is getting up in the morning and we will see how she does with a trial of Neurontin 300 mg p.o. nightly for a few days and then going up to 600 mg p.o. nightly to see if this helps her morning discomfort. She is about 75 to 80% improved overall from the end of May 2020. I like for her to go back to physical therapy and the possibility for physiatry referral for epidural trials and/or medial branch block trials was discussed but we will see where we are at in about 4 weeks and follow-up. Icing and activity modification and the importance of continuing with her exercise program was discussed. She will contact us in the interim with questions or concerns. This dictation was performed with a verbal recognition program (DRAGON) and it was checked for errors. It is possible that there are still dictated errors within this office note. If so, please bring any errors to my attention for an addendum. All efforts were made to ensure that this office note is accurate.

## 2020-07-24 RX ORDER — GABAPENTIN 300 MG/1
CAPSULE ORAL
Qty: 180 CAPSULE | Refills: 0 | Status: SHIPPED | OUTPATIENT
Start: 2020-07-24 | End: 2020-08-20

## 2020-07-28 ENCOUNTER — APPOINTMENT (OUTPATIENT)
Dept: PHYSICAL THERAPY | Age: 50
End: 2020-07-28
Payer: COMMERCIAL

## 2020-07-29 ENCOUNTER — HOSPITAL ENCOUNTER (OUTPATIENT)
Dept: PHYSICAL THERAPY | Age: 50
Setting detail: THERAPIES SERIES
Discharge: HOME | End: 2020-07-29
Payer: COMMERCIAL

## 2020-07-29 PROCEDURE — 97140 MANUAL THERAPY 1/> REGIONS: CPT

## 2020-07-29 PROCEDURE — 97110 THERAPEUTIC EXERCISES: CPT

## 2020-07-29 PROCEDURE — 97112 NEUROMUSCULAR REEDUCATION: CPT

## 2020-07-29 NOTE — FLOWSHEET NOTE
juttering or innominate mvmt)       Extension       Trendelenburg       Kemps/Quadrant       Stork       SLS/SLS w rotation                     Seated Exam Normal Abnormal N/A Comments   Pelvic Height       Seated Rotation       Seated flexion       B hip IR                     Supine Exam Normal Abnormal N/A Comments   Hip flexion       Abduction       ER       IR       ELÍAS/Derek       Hip scour       SLR       Crossed SLR       Supine to sit       SI distraction/compression       Hip thrust                     Prone Exam Normal Abnormal N/A Comments   Prone knee bend       Prone hip IR       B Achilles reflex/Pheasant       PA/Spring       Prone Instability test       Sacral Spring/thrust                       ROM LEFT RIGHT Comments   Lumbar Flex      Lumbar Ext      Side Bend      Rotation                    ROM LEFT RIGHT Comments   Hip Flexion      Hip Abd      Hip ER      Hip IR      Hip Extension      Knee Ext      Knee Flex      Hamstring Flex      Piriformis                    Strength LEFT RIGHT Comments   Multifidus      Transverse Ab      Hip Flexors      Hip Abductors      Hip Extensors                     Myotomes Normal Abnormal Comments   Hip flexion (L1-L2)      Knee extension (L2-L4)      Dorsiflexion (L4-L5)      Great Toe Ext (L5)      Ankle Eversion (S1-S2)      Ankle PF(S1-S2)          Dermatomes Normal Abnormal Comments   inguinal area (L1)       anterior mid-thigh (L2)      distal ant thigh/med knee (L3)      medial lower leg and foot (L4)      lateral lower leg and foot (L5)      posterior calf (S1)      medial calcaneus (S2)          Neural dynamic tension testing Normal Abnormal Comments   Slump Test  - Degree of knee flexion:       SLR       0-30      30-70      Femoral nerve (L2-4)        Reflexes Normal Abnormal Comments   S1-2 Seated achilles      S1-2 Prone knee bend      L3-4 Patellar tendon      C5-6 Biceps      C6 Brachioradialis      C7-8 Triceps      Clonus      Babinski Flores's            Positional Tolerance     Standing repeated flex    Standing repeated ext    Prone laying    Prone prop    Prone laying with pillow    Supine manual B LE distraction    Supine with wedge under LE              RESTRICTIONS/PRECAUTIONS: occ headaches/migraines, gal bladder , , laproscopy, scoliosis, 2 herniated disc in neck and back. Exercises/Interventions:         Therapeutic Ex (37151) Resistance/Repetitions Notes/CUES   LTR 10x:10    Some pain. Stopped early. Discussed SKTC for home or seated LB stretch   Going to right   Left. Did cause pain.        shannon flexion seated 10x10               Therapeutic Activity (21561)     Bike 8' L3                                  NMR re-education (65565)  CUES NEEDED   hooklying ab brace with pelvic floor contraction 10x:10 With biofeedback and tactile cuing   Ab brace with march 10x each With biofeedback for cuing   Cued for glute set  At TA prior to lift             Cues for level hips and not to lean to left for compensation   To engage core   Posterior pelvic tilts 10x10     Manual Intervention (25659)     Manual traction; pt supine with LE over tball  8'           Access Code: [email or web address]   URL: [email or web address]   Date: 2020   Prepared by: Yokasta Chew     Exercises  Supine Lower Trunk Rotation - 10 reps - 1 sets - 10 hold - 1-2x daily - 7x weekly  Supine Double Knee to Chest - 10 reps - 10 hold - 1-2x daily - 7x weekly  Seated Quadratus Lumborum Stretch with Arm Overhead - 3-5 reps - 30 hold - 1-2x daily - 7x weekly  Supine Hamstring Stretch with Strap - 3-5 reps - 30 hold - 1-2x daily - 7x weekly  Supine Transversus Abdominis Bracing with Pelvic Floor Contraction - 10 reps - 10 hold - 1-2x daily - 7x weekly  Standing Hip Hiking - 10 reps - 2 sets - 1-2x daily - 7x weekly  Sidelying Hip Circles - 10 reps - 1-3 sets - 1-2x daily - 7x weekly  Single Leg Balance - 3 reps - 20 hold - 1-2x daily - 7x weekly    Therapeutic Exercise and NMR EXR  [x] (94157) Provided verbal/tactile cueing for activities related to strengthening, flexibility, endurance, ROM for improvements in LE, proximal hip, and core control with self care, mobility, lifting, ambulation. [x] (62331) Provided verbal/tactile cueing for activities related to improving balance, coordination, kinesthetic sense, posture, motor skill, proprioception  to assist with LE, proximal hip, and core control in self care, mobility, lifting, ambulation and eccentric single leg control.      NMR and Therapeutic Activities:    [x] (70318 or 44659) Provided verbal/tactile cueing for activities related to improving balance, coordination, kinesthetic sense, posture, motor skill, proprioception and motor activation to allow for proper function of core, proximal hip and LE with self care and ADLs  [x] (31417) Gait Re-education- Provided training and instruction to the patient for proper LE, core and proximal hip recruitment and positioning and eccentric body weight control with ambulation re-education including up and down stairs     Home Exercise Program:    [x] (89320) Reviewed/Progressed HEP activities related to strengthening, flexibility, endurance, ROM of core, proximal hip and LE for functional self-care, mobility, lifting and ambulation/stair navigation   [] (28725)Reviewed/Progressed HEP activities related to improving balance, coordination, kinesthetic sense, posture, motor skill, proprioception of core, proximal hip and LE for self care, mobility, lifting, and ambulation/stair navigation      Manual Treatments:  PROM / STM / Oscillations-Mobs:  G-I, II, III, IV (PA's, Inf., Post.)  [x] (48663) Provided manual therapy to mobilize LE, proximal hip and/or LS spine soft tissue/joints for the purpose of modulating pain, promoting relaxation,  increasing ROM, reducing/eliminating soft tissue swelling/inflammation/restriction, improving soft tissue extensibility and allowing for proper ROM for normal function with self care, mobility, lifting and ambulation. Modalities: MHP? Charges:   Timed Code Treatment Minutes: 45   Total Treatment Minutes: 45     [] EVAL (LOW) 72813   [] EVAL (MOD) 48244   [] EVAL (HIGH) 49488   [] RE-EVAL   [x] [place](54117) x 1    [] IONTO  [x] NMR (65143) x 1     [] VASO  [x] Manual (15891) x 1     [] Other:  [] TA x      [] Mech Traction (08415)  [] ES(attended) (82820)      [] ES (un) (31937):       GOALS:   Patient stated goal: no pain. Normal movement    [] Progressing: [] Met: [] Not Met: [] Adjusted    Therapist goals for Patient:   Short Term Goals: To be achieved in: 2 weeks  1. Independent in HEP and progression per patient tolerance, in order to prevent re-injury. [] Progressing: [] Met: [] Not Met: [] Adjusted   2. Patient will report pain at worst less than or equal to 8/10  [] Progressing: [] Met: [] Not Met: [] Adjusted    Long Term Goals: To be achieved in: 6 weeks  1. Disability index score of 15% or less for the SYMONE to assist with reaching prior level of function. [] Progressing: [] Met: [] Not Met: [] Adjusted  2. Patient will demonstrate increased AROM to full lumbar mobility to allow for proper joint functioning as indicated by patients functional deficits. [] Progressing: [] Met: [] Not Met: [] Adjusted  3. Patient will demonstrate an increase in Strength to left hip flexor and hamstring to 4+/5 without pain in back to allow for proper functional mobility as indicated by patients Functional Deficits. [] Progressing: [] Met: [] Not Met: [] Adjusted  4. Patient will return to performing all self care without increased symptoms or restriction. [] Progressing: [] Met: [] Not Met: [] Adjusted  5. Pt will report pain at worst less than or equal to 2/10.     [] Progressing: [] Met: [] Not Met: [] Adjusted      Overall Progression Towards Functional goals/ Treatment Progress Update:  [] Patient is progressing as expected towards functional goals listed. [] Progression is slowed due to complexities/Impairments listed. [] Progression has been slowed due to co-morbidities. [x] Plan just implemented, too soon to assess goals progression <30days   [] Goals require adjustment due to lack of progress  [] Patient is not progressing as expected and requires additional follow up with physician  [] Other    Prognosis for POC: [x] Good [] Fair  [] Poor      Patient requires continued skilled intervention: [x] Yes  [] No    Treatment/Activity Tolerance:  [x] Patient able to complete treatment  [] Patient limited by fatigue  [] Patient limited by pain     [] Patient limited by other medical complications  [] Other: Pt notes that seated lumbar flexion stretch feels good, she had some difficulty with posterior pelvic tilts but was able to complete well by end of sets. She notes traction with LE over ball does help alleviate some pain in low back, we discussed sleeping legs propped if she thinks this position is most comfortable. PLAN: If pt doesn't return, this note can be considered a D/C note. Progress core work. Pt may not return for a couple weeks, and then she is going to be seeing her MD.  Pt to f/u with me pending thsi.    [x] Continue per plan of care [] Alter current plan (see comments above)  [] Plan of care initiated [] Hold pending MD visit [] Discharge  Electronically signed by: Jillian Swain PT, DPT

## 2020-08-04 ENCOUNTER — HOSPITAL ENCOUNTER (OUTPATIENT)
Dept: PHYSICAL THERAPY | Age: 50
Setting detail: THERAPIES SERIES
Discharge: HOME | End: 2020-08-04
Payer: COMMERCIAL

## 2020-08-04 PROCEDURE — 97110 THERAPEUTIC EXERCISES: CPT | Performed by: PHYSICAL THERAPIST

## 2020-08-04 PROCEDURE — 97112 NEUROMUSCULAR REEDUCATION: CPT | Performed by: PHYSICAL THERAPIST

## 2020-08-04 PROCEDURE — 97140 MANUAL THERAPY 1/> REGIONS: CPT | Performed by: PHYSICAL THERAPIST

## 2020-08-04 NOTE — PLAN OF CARE
Orthopaedics and [address] [place] [address] DR PALMIRA KERN   Physical Therapy Re-Certification Plan of Care    Dear Dr. Mercedes Ruiz,    We had the pleasure of treating the following patient for physical therapy services at [address] [place] [address]. A summary of our findings can be found in the updated assessment below. This includes our plan of care. If you have any questions or concerns regarding these findings, please do not hesitate to contact me at the office phone number checked above. Thank you for the referral.     Physician Signature:________________________________Date:__________________  By signing above (or electronic signature), therapists plan is approved by physician    Date Range Of Visits: 2020-2020  Total Visits to Date: 6  Overall Response to Treatment:   [x] Patient is responding well to treatment and improvement is noted with regards to goals   [] Patient should continue to improve in reasonable time if they continue HEP   [] Patient has plateaued and is no longer responding to skilled PT intervention    [] Patient is getting worse and would benefit from return to referring MD   [] Patient unable to adhere to initial POC   [] Other: Pt stated that she gets slight L back pain during posterior pelvic tilts, but she was able to complete 10 reps. She has been feeling relief with manual traction. She stated while completing the SYMONE that she is not able to sit for longer than an hour until pain starts. If she gets up and moves then the pain goes away. She also recently got a recliner and that is more comfortable for her to sit in for long periods. She is sleeping a lot better due to the Neurotin. Pt will come to therapy 1-2x more (up to 4 weeks) before she sees the MD depending on her school schedule.                       Physical Therapy Treatment Note/ Progress Report:           Date:  2020    Patient Name:  Silvia Betancur    :  1970  MRN: Positional Tolerance     Standing repeated flex    Standing repeated ext    Prone laying    Prone prop    Prone laying with pillow    Supine manual B LE distraction    Supine with wedge under LE              RESTRICTIONS/PRECAUTIONS: occ headaches/migraines, gal bladder , , laproscopy, scoliosis, 2 herniated disc in neck and back. Exercises/Interventions:         Therapeutic Ex (98018) Resistance/Repetitions Notes/CUES   LTR 10x:10    Some pain. Stopped early. Discussed SKTC for home or seated LB stretch   Going to right   Left. Did cause pain.        shannon flexion seated 10x10               Therapeutic Activity (78936)     Bike 8' L3                                  NMR re-education (86726)  CUES NEEDED   hooklying ab brace with pelvic floor contraction 10x:10 With biofeedback and tactile cuing   Ab brace with march 10x each With biofeedback for cuing   Cued for glute set  At TA prior to lift             Cues for level hips and not to lean to left for compensation   To engage core   Posterior pelvic tilts 10x10     Manual Intervention (26069)     Manual traction; pt supine with LE over tball  8'           Access Code: [email or web address]   URL: [email or web address]   Date: 2020   Prepared by: Evens Zamudio Cessna     Exercises  Supine Lower Trunk Rotation - 10 reps - 1 sets - 10 hold - 1-2x daily - 7x weekly  Supine Double Knee to Chest - 10 reps - 10 hold - 1-2x daily - 7x weekly  Seated Quadratus Lumborum Stretch with Arm Overhead - 3-5 reps - 30 hold - 1-2x daily - 7x weekly  Supine Hamstring Stretch with Strap - 3-5 reps - 30 hold - 1-2x daily - 7x weekly  Supine Transversus Abdominis Bracing with Pelvic Floor Contraction - 10 reps - 10 hold - 1-2x daily - 7x weekly  Standing Hip Hiking - 10 reps - 2 sets - 1-2x daily - 7x weekly  Sidelying Hip Circles - 10 reps - 1-3 sets - 1-2x daily - 7x weekly  Single Leg Balance - 3 reps - 20 hold - 1-2x daily - 7x weekly    Therapeutic Exercise and NMR EXR  [x] (56004) Provided verbal/tactile cueing for activities related to strengthening, flexibility, endurance, ROM for improvements in LE, proximal hip, and core control with self care, mobility, lifting, ambulation. [x] (07352) Provided verbal/tactile cueing for activities related to improving balance, coordination, kinesthetic sense, posture, motor skill, proprioception  to assist with LE, proximal hip, and core control in self care, mobility, lifting, ambulation and eccentric single leg control.      NMR and Therapeutic Activities:    [x] (24051 or 40851) Provided verbal/tactile cueing for activities related to improving balance, coordination, kinesthetic sense, posture, motor skill, proprioception and motor activation to allow for proper function of core, proximal hip and LE with self care and ADLs  [x] (53927) Gait Re-education- Provided training and instruction to the patient for proper LE, core and proximal hip recruitment and positioning and eccentric body weight control with ambulation re-education including up and down stairs     Home Exercise Program:    [x] (38705) Reviewed/Progressed HEP activities related to strengthening, flexibility, endurance, ROM of core, proximal hip and LE for functional self-care, mobility, lifting and ambulation/stair navigation   [] (87071)Reviewed/Progressed HEP activities related to improving balance, coordination, kinesthetic sense, posture, motor skill, proprioception of core, proximal hip and LE for self care, mobility, lifting, and ambulation/stair navigation      Manual Treatments:  PROM / STM / Oscillations-Mobs:  G-I, II, III, IV (PA's, Inf., Post.)  [x] (72296) Provided manual therapy to mobilize LE, proximal hip and/or LS spine soft tissue/joints for the purpose of modulating pain, promoting relaxation,  increasing ROM, reducing/eliminating soft tissue swelling/inflammation/restriction, improving soft tissue extensibility and allowing for proper ROM for normal function with self care, mobility, lifting and ambulation. Modalities: declined    Charges:   Timed Code Treatment Minutes: 48'   Total Treatment Minutes: 61'     [] EVAL (LOW) 455 [id number]   [] EVAL (MOD) 87020   [] EVAL (HIGH) 41494   [] RE-EVAL   [x] BL(10723) x 1    [] IONTO  [x] NMR (14712) x 1     [] VASO  [x] Manual (80442) x 1     [] Other:  [] TA x      [] Mech Traction (63058)  [] ES(attended) (52608)      [] ES (un) (93100):       GOALS:   Patient stated goal: no pain. Normal movement    [x] Progressing: [] Met: [] Not Met: [] Adjusted    Therapist goals for Patient:   Short Term Goals: To be achieved in: 2 weeks  1. Independent in HEP and progression per patient tolerance, in order to prevent re-injury. [] Progressing: [x] Met: [] Not Met: [] Adjusted   2. Patient will report pain at worst less than or equal to 8/10  [] Progressing: [x] Met: [] Not Met: [] Adjusted    Long Term Goals: To be achieved in: 6 weeks  1. Disability index score of 15% or less for the SYMONE to assist with reaching prior level of function. [x] Progressing: [] Met: [] Not Met: [] Adjusted  2. Patient will demonstrate increased AROM to full lumbar mobility to allow for proper joint functioning as indicated by patients functional deficits. [] Progressing: [x] Met: [] Not Met: [] Adjusted  3. Patient will demonstrate an increase in Strength to left hip flexor and hamstring to 4+/5 without pain in back to allow for proper functional mobility as indicated by patients Functional Deficits. [x] Progressing: [] Met: [] Not Met: [] Adjusted  4. Patient will return to performing all self care without increased symptoms or restriction. [] Progressing: [x] Met: [] Not Met: [] Adjusted   5. Pt will report pain at worst less than or equal to 2/10.     [x] Progressing: [] Met: [] Not Met: [] Adjusted      Overall Progression Towards Functional goals/ Treatment Progress Update:  [x] Patient is progressing as expected towards functional goals listed. [] Progression is slowed due to complexities/Impairments listed. [] Progression has been slowed due to co-morbidities. [] Plan just implemented, too soon to assess goals progression <30days   [] Goals require adjustment due to lack of progress  [] Patient is not progressing as expected and requires additional follow up with physician  [] Other    Prognosis for POC: [x] Good [] Fair  [] Poor      Patient requires continued skilled intervention: [x] Yes  [] No    Treatment/Activity Tolerance:  [x] Patient able to complete treatment  [] Patient limited by fatigue  [] Patient limited by pain     [] Patient limited by other medical complications  [] Other: Pt stated that she gets slight L back pain during posterior pelvic tilts, but she was able to complete 10 reps. She has been feeling relief with manual traction. She stated while completing the SYMONE that she is not able to sit for longer than an hour until pain starts. If she gets up and moves then the pain goes away. She also recently got a recliner and that is more comfortable for her to sit in for long periods. She is sleeping a lot better due to the Neurotin. Pt will come to therapy 1-2x more before she sees the MD depending on her school schedule. PLAN: If pt doesn't return, this note can be considered a D/C note. Progress core work. [x] Continue per plan of care [] Alter current plan (see comments above)  [] Plan of care initiated [] Hold pending MD visit [] Discharge  Electronically signed by:  Asha Nguyen, Student Physical Therapist  Therapist was present, directed the patient's care, made skilled judgement, and was responsible for assessment and treatment of the patient.     LO Mckeon [phone]

## 2020-08-11 ENCOUNTER — HOSPITAL ENCOUNTER (OUTPATIENT)
Dept: PHYSICAL THERAPY | Age: 50
Setting detail: THERAPIES SERIES
Discharge: HOME | End: 2020-08-11
Payer: COMMERCIAL

## 2020-08-11 NOTE — FLOWSHEET NOTE
Physical Therapy  Cancellation/No-show Note  Patient Name:  Mauro Santamaria  :  1970   Date:  2020  Cancelled visits to date: 01  No-shows to date: 0    For today's appointment patient:  [x]  Cancelled  []  Rescheduled appointment  []  No-show     Reason given by patient:  []  Patient ill  []  Conflicting appointment  []  No transportation    []  Conflict with work  []  No reason given  []  Other:     Comments:  Pt cancelled due to possible COVID exposure.      Electronically signed by:  Francheska Proctor PT

## 2020-08-20 RX ORDER — GABAPENTIN 300 MG/1
CAPSULE ORAL
Qty: 180 CAPSULE | Refills: 0 | Status: SHIPPED | OUTPATIENT
Start: 2020-08-20 | End: 2020-10-20